# Patient Record
Sex: MALE | Race: WHITE | Employment: OTHER | URBAN - METROPOLITAN AREA
[De-identification: names, ages, dates, MRNs, and addresses within clinical notes are randomized per-mention and may not be internally consistent; named-entity substitution may affect disease eponyms.]

---

## 2017-02-22 RX ORDER — ATENOLOL 25 MG/1
50 TABLET ORAL DAILY
COMMUNITY
Start: 2015-10-21 | End: 2017-03-08

## 2017-02-22 RX ORDER — ROSUVASTATIN CALCIUM 5 MG/1
5 TABLET, COATED ORAL DAILY
COMMUNITY
Start: 2008-11-19 | End: 2017-10-23

## 2017-02-22 RX ORDER — LOSARTAN POTASSIUM 25 MG/1
25 TABLET ORAL DAILY
COMMUNITY
Start: 2016-01-18 | End: 2017-03-08

## 2017-02-22 RX ORDER — ALLOPURINOL 300 MG/1
300 TABLET ORAL DAILY
COMMUNITY
Start: 2011-09-29 | End: 2018-03-01

## 2017-02-22 RX ORDER — INDOMETHACIN 50 MG/1
50 CAPSULE ORAL 4 TIMES DAILY PRN
COMMUNITY
Start: 2012-10-08 | End: 2017-11-06

## 2017-03-08 ENCOUNTER — OFFICE VISIT (OUTPATIENT)
Dept: FAMILY MEDICINE CLINIC | Facility: CLINIC | Age: 67
End: 2017-03-08

## 2017-03-08 VITALS
SYSTOLIC BLOOD PRESSURE: 170 MMHG | TEMPERATURE: 97 F | DIASTOLIC BLOOD PRESSURE: 90 MMHG | WEIGHT: 200 LBS | HEIGHT: 71.5 IN | HEART RATE: 68 BPM | BODY MASS INDEX: 27.39 KG/M2

## 2017-03-08 DIAGNOSIS — I10 ESSENTIAL HYPERTENSION: Primary | ICD-10-CM

## 2017-03-08 PROBLEM — I83.90 ASYMPTOMATIC VARICOSE VEINS: Status: ACTIVE | Noted: 2017-03-08

## 2017-03-08 PROBLEM — E78.49 FAMILIAL MULTIPLE LIPOPROTEIN-TYPE HYPERLIPIDEMIA: Status: ACTIVE | Noted: 2017-03-08

## 2017-03-08 PROCEDURE — 99213 OFFICE O/P EST LOW 20 MIN: CPT | Performed by: FAMILY MEDICINE

## 2017-03-08 RX ORDER — LOSARTAN POTASSIUM 50 MG/1
50 TABLET ORAL DAILY
Qty: 90 TABLET | Refills: 3 | COMMUNITY
Start: 2017-03-08 | End: 2017-04-13

## 2017-03-08 RX ORDER — ATENOLOL 50 MG/1
50 TABLET ORAL DAILY
Qty: 90 TABLET | Refills: 3 | Status: SHIPPED | OUTPATIENT
Start: 2017-03-08 | End: 2018-03-01

## 2017-03-09 ENCOUNTER — TELEPHONE (OUTPATIENT)
Dept: FAMILY MEDICINE CLINIC | Facility: CLINIC | Age: 67
End: 2017-03-09

## 2017-03-09 DIAGNOSIS — N40.0 BENIGN PROSTATIC HYPERPLASIA WITHOUT LOWER URINARY TRACT SYMPTOMS, UNSPECIFIED MORPHOLOGY: ICD-10-CM

## 2017-03-09 DIAGNOSIS — I15.9 SECONDARY HYPERTENSION: ICD-10-CM

## 2017-03-09 DIAGNOSIS — M10.9 GOUT, UNSPECIFIED CAUSE, UNSPECIFIED CHRONICITY, UNSPECIFIED SITE: ICD-10-CM

## 2017-03-09 DIAGNOSIS — E11.9 CONTROLLED TYPE 2 DIABETES MELLITUS WITHOUT COMPLICATION, WITHOUT LONG-TERM CURRENT USE OF INSULIN (HCC): ICD-10-CM

## 2017-03-09 DIAGNOSIS — E78.5 HYPERLIPIDEMIA, UNSPECIFIED HYPERLIPIDEMIA TYPE: Primary | ICD-10-CM

## 2017-03-09 NOTE — PROGRESS NOTES
[address]  PROGRESS NOTE  Chief Complaint:   Patient presents with: Follow - Up: BP check      HPI:   This is a 77year old male coming in for recheck of hypertension.   The fall patient's atenolol was decreased to 25 mg daily while add THE EVENING Disp: 270 tablet Rfl: 0      Counseling given: Not Answered       REVIEW OF SYSTEMS:   Denies eye, ear, nose, throat, cardiovascular, respiratory, gastrointestinal, genitourinary, musculoskeletal, skin, or neurological sxs other than those desc with any questions, complications, allergies, or worsening or changing symptoms. Patient is to call with any side effects or complications from the treatments as a result of today.      Problem List:  Patient Active Problem List:     Eczema     Diabetes me

## 2017-04-13 RX ORDER — LOSARTAN POTASSIUM 50 MG/1
50 TABLET ORAL DAILY
Qty: 90 TABLET | Refills: 3 | Status: SHIPPED | OUTPATIENT
Start: 2017-04-13 | End: 2018-03-01

## 2017-04-22 ENCOUNTER — LAB ENCOUNTER (OUTPATIENT)
Dept: LAB | Age: 67
End: 2017-04-22
Attending: FAMILY MEDICINE
Payer: MEDICARE

## 2017-04-22 DIAGNOSIS — N40.0 BENIGN PROSTATIC HYPERPLASIA WITHOUT LOWER URINARY TRACT SYMPTOMS, UNSPECIFIED MORPHOLOGY: ICD-10-CM

## 2017-04-22 DIAGNOSIS — E78.5 HYPERLIPIDEMIA, UNSPECIFIED HYPERLIPIDEMIA TYPE: ICD-10-CM

## 2017-04-22 DIAGNOSIS — M10.9 GOUT, UNSPECIFIED CAUSE, UNSPECIFIED CHRONICITY, UNSPECIFIED SITE: ICD-10-CM

## 2017-04-22 DIAGNOSIS — E11.9 CONTROLLED TYPE 2 DIABETES MELLITUS WITHOUT COMPLICATION, WITHOUT LONG-TERM CURRENT USE OF INSULIN (HCC): ICD-10-CM

## 2017-04-22 DIAGNOSIS — I15.9 SECONDARY HYPERTENSION: ICD-10-CM

## 2017-04-22 PROCEDURE — 84550 ASSAY OF BLOOD/URIC ACID: CPT

## 2017-04-22 PROCEDURE — 84443 ASSAY THYROID STIM HORMONE: CPT

## 2017-04-22 PROCEDURE — 85025 COMPLETE CBC W/AUTO DIFF WBC: CPT

## 2017-04-22 PROCEDURE — 80061 LIPID PANEL: CPT

## 2017-04-22 PROCEDURE — 82570 ASSAY OF URINE CREATININE: CPT

## 2017-04-22 PROCEDURE — 84153 ASSAY OF PSA TOTAL: CPT

## 2017-04-22 PROCEDURE — 82043 UR ALBUMIN QUANTITATIVE: CPT

## 2017-04-22 PROCEDURE — 80053 COMPREHEN METABOLIC PANEL: CPT

## 2017-04-22 PROCEDURE — 83036 HEMOGLOBIN GLYCOSYLATED A1C: CPT

## 2017-04-24 ENCOUNTER — TELEPHONE (OUTPATIENT)
Dept: FAMILY MEDICINE CLINIC | Facility: CLINIC | Age: 67
End: 2017-04-24

## 2017-04-24 NOTE — TELEPHONE ENCOUNTER
Informed pt of his blood work results. Pt will watch diet and repeat blood work in 3 months. Pt expressed understanding and thanks.

## 2017-04-24 NOTE — TELEPHONE ENCOUNTER
----- Message from Myrl Lesch, MD sent at 4/24/2017  3:56 PM CDT -----  Labs are normal with exception of FBS of 147 and an A1c of 6.9, was 6.4, 6.6 and 7.0 in the past.  Sodium is slightly low at 134 but patient is not on a diuretic.   This is not sig

## 2017-04-29 ENCOUNTER — TELEPHONE (OUTPATIENT)
Dept: FAMILY MEDICINE CLINIC | Facility: CLINIC | Age: 67
End: 2017-04-29

## 2017-04-29 DIAGNOSIS — E11.9 CONTROLLED TYPE 2 DIABETES MELLITUS WITHOUT COMPLICATION, WITHOUT LONG-TERM CURRENT USE OF INSULIN (HCC): Primary | ICD-10-CM

## 2017-07-05 ENCOUNTER — TELEPHONE (OUTPATIENT)
Dept: FAMILY MEDICINE CLINIC | Facility: CLINIC | Age: 67
End: 2017-07-05

## 2017-07-05 NOTE — TELEPHONE ENCOUNTER
Patient is wondering if he was due for labs. Informed patient that he is due for labs at the end of the month. Patient confirmed and was transferred to the front to make appt. No other questions at this time.      Future Appointments  Date Time Provider Dep

## 2017-07-26 ENCOUNTER — APPOINTMENT (OUTPATIENT)
Dept: LAB | Age: 67
End: 2017-07-26
Attending: FAMILY MEDICINE
Payer: MEDICARE

## 2017-07-26 DIAGNOSIS — E11.9 CONTROLLED TYPE 2 DIABETES MELLITUS WITHOUT COMPLICATION, WITHOUT LONG-TERM CURRENT USE OF INSULIN (HCC): ICD-10-CM

## 2017-07-26 LAB
BUN BLD-MCNC: 14 MG/DL (ref 8–20)
CALCIUM BLD-MCNC: 8.5 MG/DL (ref 8.3–10.3)
CHLORIDE: 104 MMOL/L (ref 101–111)
CO2: 31 MMOL/L (ref 22–32)
CREAT BLD-MCNC: 1.11 MG/DL (ref 0.7–1.3)
EST. AVERAGE GLUCOSE BLD GHB EST-MCNC: 140 MG/DL (ref 68–126)
GLUCOSE BLD-MCNC: 151 MG/DL (ref 70–99)
HBA1C MFR BLD HPLC: 6.5 % (ref ?–5.7)
POTASSIUM SERPL-SCNC: 4.4 MMOL/L (ref 3.6–5.1)
SODIUM SERPL-SCNC: 140 MMOL/L (ref 136–144)

## 2017-07-26 PROCEDURE — 83036 HEMOGLOBIN GLYCOSYLATED A1C: CPT

## 2017-07-26 PROCEDURE — 80048 BASIC METABOLIC PNL TOTAL CA: CPT

## 2017-07-26 PROCEDURE — 36415 COLL VENOUS BLD VENIPUNCTURE: CPT

## 2017-07-27 ENCOUNTER — TELEPHONE (OUTPATIENT)
Dept: FAMILY MEDICINE CLINIC | Facility: CLINIC | Age: 67
End: 2017-07-27

## 2017-07-27 NOTE — TELEPHONE ENCOUNTER
----- Message from TADEO Evans sent at 7/27/2017  2:47 PM CDT -----  Dr. Emmy Anna notify patient that his A1c has improved it is now 6.5 (it was 6.9)  His CMP shows that his electrolytes are within normal limits (they had been abn

## 2017-07-27 NOTE — TELEPHONE ENCOUNTER
Informed pt of his blood work results. Pt was very happt with his a1c. Pt will recheck HTN in September or October. Pt expressed understanding and thanks.

## 2017-10-23 RX ORDER — ROSUVASTATIN CALCIUM 5 MG/1
TABLET, COATED ORAL
Qty: 90 TABLET | Refills: 1 | Status: SHIPPED | OUTPATIENT
Start: 2017-10-23 | End: 2018-04-30

## 2017-10-23 NOTE — TELEPHONE ENCOUNTER
Future appt:  None  Last Appointment:  3/8/2017; No f/u recommended    Cholesterol, Total (mg/dL)   Date Value   04/22/2017 136   ----------  HDL Cholesterol (mg/dL)   Date Value   04/22/2017 37 (L)   ----------  LDL Cholesterol (mg/dL)   Date Value   04/2

## 2017-11-06 ENCOUNTER — OFFICE VISIT (OUTPATIENT)
Dept: FAMILY MEDICINE CLINIC | Facility: CLINIC | Age: 67
End: 2017-11-06

## 2017-11-06 ENCOUNTER — APPOINTMENT (OUTPATIENT)
Dept: LAB | Age: 67
End: 2017-11-06
Attending: FAMILY MEDICINE
Payer: MEDICARE

## 2017-11-06 VITALS
TEMPERATURE: 97 F | SYSTOLIC BLOOD PRESSURE: 156 MMHG | HEART RATE: 54 BPM | DIASTOLIC BLOOD PRESSURE: 78 MMHG | RESPIRATION RATE: 16 BRPM | HEIGHT: 71.75 IN | BODY MASS INDEX: 27.55 KG/M2 | WEIGHT: 201.19 LBS

## 2017-11-06 DIAGNOSIS — L30.9 DERMATITIS: Primary | ICD-10-CM

## 2017-11-06 DIAGNOSIS — E11.9 CONTROLLED TYPE 2 DIABETES MELLITUS WITHOUT COMPLICATION, WITHOUT LONG-TERM CURRENT USE OF INSULIN (HCC): ICD-10-CM

## 2017-11-06 PROCEDURE — 83036 HEMOGLOBIN GLYCOSYLATED A1C: CPT | Performed by: FAMILY MEDICINE

## 2017-11-06 PROCEDURE — 99213 OFFICE O/P EST LOW 20 MIN: CPT | Performed by: FAMILY MEDICINE

## 2017-11-06 PROCEDURE — 36415 COLL VENOUS BLD VENIPUNCTURE: CPT | Performed by: FAMILY MEDICINE

## 2017-11-06 NOTE — PROGRESS NOTES
[address]  PROGRESS NOTE  Chief Complaint:   Patient presents with:  Rash: rash on hands, feet and back of legs. 12 days.  very itchy      HPI:   This is a 77year old male coming in for rash on hands and ankles/feet for the past 12 days ONCE DAILY Disp: 90 tablet Rfl: 1   METFORMIN  MG Oral Tab TAKE ONE TABLET BY MOUTH IN THE MORNING AND TWO IN THE EVENING Disp: 270 tablet Rfl: 3   Losartan Potassium 50 MG Oral Tab Take 1 tablet (50 mg total) by mouth daily.  Disp: 90 tablet Rfl: 3 needed. PLAN: Nonspecific dermatitis. These appear to be somewhat like bug bites. Not particularly typical of scabies. Has no pets at home and has not traveled anywhere. Will use cortisone cream for right now.   If no improvement then must consid

## 2017-11-07 ENCOUNTER — TELEPHONE (OUTPATIENT)
Dept: FAMILY MEDICINE CLINIC | Facility: CLINIC | Age: 67
End: 2017-11-07

## 2017-11-07 NOTE — TELEPHONE ENCOUNTER
Patient informed of the below results and recommendations. Patient will c/b to schedule a lab appt in 3 months.

## 2017-11-07 NOTE — TELEPHONE ENCOUNTER
----- Message from Liz Kolb MD sent at 11/6/2017  6:45 PM CST -----  A1c is 6.7, was 6.5 and 6.9. Patient would like him to repeat A1c in 3 more months.   Please notify patient

## 2018-03-01 ENCOUNTER — OFFICE VISIT (OUTPATIENT)
Dept: FAMILY MEDICINE CLINIC | Facility: CLINIC | Age: 68
End: 2018-03-01

## 2018-03-01 ENCOUNTER — APPOINTMENT (OUTPATIENT)
Dept: LAB | Age: 68
End: 2018-03-01
Attending: FAMILY MEDICINE
Payer: MEDICARE

## 2018-03-01 VITALS
RESPIRATION RATE: 16 BRPM | BODY MASS INDEX: 28.7 KG/M2 | HEIGHT: 71 IN | WEIGHT: 205 LBS | HEART RATE: 52 BPM | TEMPERATURE: 97 F | SYSTOLIC BLOOD PRESSURE: 140 MMHG | DIASTOLIC BLOOD PRESSURE: 86 MMHG

## 2018-03-01 DIAGNOSIS — E11.9 CONTROLLED TYPE 2 DIABETES MELLITUS WITHOUT COMPLICATION, WITHOUT LONG-TERM CURRENT USE OF INSULIN (HCC): ICD-10-CM

## 2018-03-01 DIAGNOSIS — E78.5 HYPERLIPIDEMIA, UNSPECIFIED HYPERLIPIDEMIA TYPE: ICD-10-CM

## 2018-03-01 DIAGNOSIS — M10.9 GOUT, UNSPECIFIED CAUSE, UNSPECIFIED CHRONICITY, UNSPECIFIED SITE: ICD-10-CM

## 2018-03-01 DIAGNOSIS — E78.49 FAMILIAL MULTIPLE LIPOPROTEIN-TYPE HYPERLIPIDEMIA: ICD-10-CM

## 2018-03-01 DIAGNOSIS — Z00.00 HEALTH CARE MAINTENANCE: Primary | ICD-10-CM

## 2018-03-01 DIAGNOSIS — I10 ESSENTIAL HYPERTENSION: ICD-10-CM

## 2018-03-01 LAB
EST. AVERAGE GLUCOSE BLD GHB EST-MCNC: 171 MG/DL (ref 68–126)
HBA1C MFR BLD HPLC: 7.6 % (ref ?–5.7)

## 2018-03-01 PROCEDURE — 36415 COLL VENOUS BLD VENIPUNCTURE: CPT | Performed by: FAMILY MEDICINE

## 2018-03-01 PROCEDURE — 83036 HEMOGLOBIN GLYCOSYLATED A1C: CPT | Performed by: FAMILY MEDICINE

## 2018-03-01 PROCEDURE — 90732 PPSV23 VACC 2 YRS+ SUBQ/IM: CPT | Performed by: FAMILY MEDICINE

## 2018-03-01 PROCEDURE — G0439 PPPS, SUBSEQ VISIT: HCPCS | Performed by: FAMILY MEDICINE

## 2018-03-01 PROCEDURE — G0102 PROSTATE CA SCREENING; DRE: HCPCS | Performed by: FAMILY MEDICINE

## 2018-03-01 PROCEDURE — G0009 ADMIN PNEUMOCOCCAL VACCINE: HCPCS | Performed by: FAMILY MEDICINE

## 2018-03-01 RX ORDER — ALLOPURINOL 300 MG/1
300 TABLET ORAL DAILY
Qty: 90 TABLET | Refills: 3 | Status: SHIPPED | OUTPATIENT
Start: 2018-03-01 | End: 2019-01-07

## 2018-03-01 RX ORDER — LOSARTAN POTASSIUM 50 MG/1
50 TABLET ORAL DAILY
Qty: 90 TABLET | Refills: 3 | Status: SHIPPED | OUTPATIENT
Start: 2018-03-01 | End: 2019-01-07

## 2018-03-01 RX ORDER — ATENOLOL 50 MG/1
50 TABLET ORAL DAILY
Qty: 90 TABLET | Refills: 3 | Status: SHIPPED | OUTPATIENT
Start: 2018-03-01 | End: 2019-01-07

## 2018-03-01 NOTE — PROGRESS NOTES
[facility] [place]  PROGRESS NOTE  Chief Complaint:   Patient presents with:  Physical: refill atenolol and allopurinol       HPI:   This is a 79year old male coming in for general wellness and recheck of problems of.   Overall the patient feels needed. Disp: 60 g Rfl: 1   ROSUVASTATIN CALCIUM 5 MG Oral Tab TAKE ONE TABLET BY MOUTH ONCE DAILY Disp: 90 tablet Rfl: 1   Glucose Blood (ONETOUCH ULTRA BLUE) In Vitro Strip  Disp:  Rfl:    MetFORMIN HCl 500 MG Oral Tab 500 mg 3 (three) times daily.  Disp: icterus, conjunctivae clear bilaterally, no eye discharge Ears: External normal. Nose: patent, no nasal discharge Throat:  No tonsillar erythema or exudate. Mouth:  No oral lesions or ulcerations, good dentition.   NECK: Supple, no CLAD, no JVD, no thyrome Hyperlipidemia: Continue to watch diet and continue with medication. 4.  BPH: Continue stay hydrated. 5.  History of gout: No new episodes. Continue with allopurinol. We will do full labs in 3 months.       Health Maintenance:  Diabetes Care Foot Exam d

## 2018-03-02 ENCOUNTER — TELEPHONE (OUTPATIENT)
Dept: FAMILY MEDICINE CLINIC | Facility: CLINIC | Age: 68
End: 2018-03-02

## 2018-03-02 NOTE — TELEPHONE ENCOUNTER
Informed pt of his blood work results. Pt expressed understanding and will watch diet. Schedule blood work for 3 months.     Future Appointments  Date Time Provider Marlen Benitez   6/4/2018 8:00 AM REF [place] REF [facility]

## 2018-03-02 NOTE — TELEPHONE ENCOUNTER
----- Message from Kathy Benitez MD sent at 3/2/2018  7:04 AM CST -----  A1c is 7.6. As discussed at yesterday's office visit, patient will do better on diet and recheck labs in 3 months. Please notify patient. I believe the patient requested a copy.

## 2018-04-17 ENCOUNTER — TELEPHONE (OUTPATIENT)
Dept: FAMILY MEDICINE CLINIC | Facility: CLINIC | Age: 68
End: 2018-04-17

## 2018-04-17 NOTE — TELEPHONE ENCOUNTER
Patient informed that he was given #90 with 3 RFs on 3/1/2018 to [facility]. Patient will contact the pharmacy and c/b if any problems.

## 2018-04-30 RX ORDER — ROSUVASTATIN CALCIUM 5 MG/1
TABLET, COATED ORAL
Qty: 90 TABLET | Refills: 2 | Status: SHIPPED | OUTPATIENT
Start: 2018-04-30 | End: 2019-01-07

## 2018-04-30 NOTE — TELEPHONE ENCOUNTER
Future appt:     Your appointments     Date & Time Appointment Department [facility])    Jun 04, 2018  8:00 AM CDT Laboratory Visit with REF [facility] ([facility] [place])        [facility]  [facility] [place]  [address] [place] [address]

## 2018-05-09 ENCOUNTER — OFFICE VISIT (OUTPATIENT)
Dept: FAMILY MEDICINE CLINIC | Facility: CLINIC | Age: 68
End: 2018-05-09

## 2018-05-09 VITALS
WEIGHT: 206.38 LBS | TEMPERATURE: 97 F | HEART RATE: 52 BPM | RESPIRATION RATE: 16 BRPM | SYSTOLIC BLOOD PRESSURE: 124 MMHG | HEIGHT: 71 IN | OXYGEN SATURATION: 96 % | DIASTOLIC BLOOD PRESSURE: 74 MMHG | BODY MASS INDEX: 28.89 KG/M2

## 2018-05-09 DIAGNOSIS — J40 BRONCHITIS: Primary | ICD-10-CM

## 2018-05-09 PROCEDURE — 99214 OFFICE O/P EST MOD 30 MIN: CPT | Performed by: NURSE PRACTITIONER

## 2018-05-09 RX ORDER — AZITHROMYCIN 250 MG/1
TABLET, FILM COATED ORAL
Qty: 6 TABLET | Refills: 0 | Status: SHIPPED | OUTPATIENT
Start: 2018-05-09 | End: 2018-06-07 | Stop reason: ALTCHOICE

## 2018-05-09 RX ORDER — PROMETHAZINE HYDROCHLORIDE AND CODEINE PHOSPHATE 6.25; 1 MG/5ML; MG/5ML
2.5 SYRUP ORAL EVERY 4 HOURS PRN
Qty: 240 ML | Refills: 0 | Status: SHIPPED | OUTPATIENT
Start: 2018-05-09 | End: 2018-06-07 | Stop reason: ALTCHOICE

## 2018-05-09 NOTE — PATIENT INSTRUCTIONS
Rest, increase fluids, salt water gargles ,neti pot (use distilled water) or saline nasal spray, Mucinex-DM, Tylenol/Ibuprofen, follow up if symptoms persist or increase.

## 2018-05-09 NOTE — PROGRESS NOTES
CHIEF COMPLAINT:   Patient presents with:  Cough: coughing, chest congestion, started last wednesday      HPI:   Isidra Neumann is a 79year old male who presents to clinic today with complaints of coughing for a week- thought it was a little better yesterda Smokeless tobacco: Never Used                      Alcohol use:  No                 REVIEW OF SYSTEMS:   GENERAL: See HPI  SKIN: no unusual skin lesions or rashes  HEENT: See HPI  THYROID: normal size, no nodules  LUNGS: see HPI   CARDIOVASCULAR: No delfina Visit:    Signed Prescriptions Disp Refills    azithromycin (ZITHROMAX Z-ADA) 250 MG Oral Tab 6 tablet 0      Sig: Take two tablets by mouth today, then one tablet daily.       promethazine-codeine 6.25-10 MG/5ML Oral Syrup 240 mL 0      Sig: Take 2.5 mL by

## 2018-05-19 ENCOUNTER — TELEPHONE (OUTPATIENT)
Dept: FAMILY MEDICINE CLINIC | Facility: CLINIC | Age: 68
End: 2018-05-19

## 2018-05-19 NOTE — TELEPHONE ENCOUNTER
Future appt:     Your appointments     Date & Time Appointment Department [facility])    Jun 04, 2018  8:00 AM CDT Laboratory Visit with REF [facility] ([facility] [place])        [id number] [facility]  [facility] [place]  [address] [place] [address]

## 2018-06-02 ENCOUNTER — TELEPHONE (OUTPATIENT)
Dept: FAMILY MEDICINE CLINIC | Facility: CLINIC | Age: 68
End: 2018-06-02

## 2018-06-02 DIAGNOSIS — M10.9 GOUT, UNSPECIFIED CAUSE, UNSPECIFIED CHRONICITY, UNSPECIFIED SITE: ICD-10-CM

## 2018-06-02 DIAGNOSIS — I10 ESSENTIAL HYPERTENSION: ICD-10-CM

## 2018-06-02 DIAGNOSIS — N40.1 BENIGN PROSTATIC HYPERPLASIA WITH LOWER URINARY TRACT SYMPTOMS, SYMPTOM DETAILS UNSPECIFIED: ICD-10-CM

## 2018-06-02 DIAGNOSIS — E78.5 HYPERLIPIDEMIA, UNSPECIFIED HYPERLIPIDEMIA TYPE: ICD-10-CM

## 2018-06-02 DIAGNOSIS — E11.9 CONTROLLED TYPE 2 DIABETES MELLITUS WITHOUT COMPLICATION, WITHOUT LONG-TERM CURRENT USE OF INSULIN (HCC): Primary | ICD-10-CM

## 2018-06-02 NOTE — TELEPHONE ENCOUNTER
----- Message from Leonard Plata sent at 6/2/2018 10:34 AM CDT -----  Regarding: lab orders needed   Patient has lab appointment on 6/4/18 could you please put lab orders in system.         Terrie

## 2018-06-04 ENCOUNTER — LABORATORY ENCOUNTER (OUTPATIENT)
Dept: LAB | Age: 68
End: 2018-06-04
Attending: FAMILY MEDICINE
Payer: MEDICARE

## 2018-06-04 DIAGNOSIS — N40.1 BENIGN PROSTATIC HYPERPLASIA WITH LOWER URINARY TRACT SYMPTOMS, SYMPTOM DETAILS UNSPECIFIED: ICD-10-CM

## 2018-06-04 DIAGNOSIS — E78.5 HYPERLIPIDEMIA, UNSPECIFIED HYPERLIPIDEMIA TYPE: ICD-10-CM

## 2018-06-04 DIAGNOSIS — I10 ESSENTIAL HYPERTENSION: ICD-10-CM

## 2018-06-04 DIAGNOSIS — E11.9 CONTROLLED TYPE 2 DIABETES MELLITUS WITHOUT COMPLICATION, WITHOUT LONG-TERM CURRENT USE OF INSULIN (HCC): ICD-10-CM

## 2018-06-04 DIAGNOSIS — M10.9 GOUT, UNSPECIFIED CAUSE, UNSPECIFIED CHRONICITY, UNSPECIFIED SITE: ICD-10-CM

## 2018-06-04 PROCEDURE — 84443 ASSAY THYROID STIM HORMONE: CPT

## 2018-06-04 PROCEDURE — 81003 URINALYSIS AUTO W/O SCOPE: CPT

## 2018-06-04 PROCEDURE — 36415 COLL VENOUS BLD VENIPUNCTURE: CPT

## 2018-06-04 PROCEDURE — 82043 UR ALBUMIN QUANTITATIVE: CPT

## 2018-06-04 PROCEDURE — 80053 COMPREHEN METABOLIC PANEL: CPT

## 2018-06-04 PROCEDURE — 84550 ASSAY OF BLOOD/URIC ACID: CPT

## 2018-06-04 PROCEDURE — 82570 ASSAY OF URINE CREATININE: CPT

## 2018-06-04 PROCEDURE — 80061 LIPID PANEL: CPT

## 2018-06-04 PROCEDURE — 85025 COMPLETE CBC W/AUTO DIFF WBC: CPT

## 2018-06-04 PROCEDURE — 83036 HEMOGLOBIN GLYCOSYLATED A1C: CPT

## 2018-06-04 PROCEDURE — 84153 ASSAY OF PSA TOTAL: CPT

## 2018-06-06 ENCOUNTER — TELEPHONE (OUTPATIENT)
Dept: FAMILY MEDICINE CLINIC | Facility: CLINIC | Age: 68
End: 2018-06-06

## 2018-06-06 NOTE — TELEPHONE ENCOUNTER
----- Message from Tad Goodrich MD sent at 6/6/2018  7:19 AM CDT -----  Labs are normal with the exception of a glucose of 164 and an A1c of 7.6, unchanged from 7.6.   Please notify patient and tell him that I would like him to make an appointment to [facility]

## 2018-06-06 NOTE — TELEPHONE ENCOUNTER
Informed pt of his blood work results. Pt expressed understanding and thanks. Schedule appt for tomorrow to discuss DM.

## 2018-06-07 ENCOUNTER — OFFICE VISIT (OUTPATIENT)
Dept: FAMILY MEDICINE CLINIC | Facility: CLINIC | Age: 68
End: 2018-06-07

## 2018-06-07 VITALS
RESPIRATION RATE: 16 BRPM | HEART RATE: 58 BPM | TEMPERATURE: 98 F | SYSTOLIC BLOOD PRESSURE: 120 MMHG | WEIGHT: 203.19 LBS | HEIGHT: 71 IN | DIASTOLIC BLOOD PRESSURE: 74 MMHG | BODY MASS INDEX: 28.45 KG/M2

## 2018-06-07 DIAGNOSIS — E11.9 CONTROLLED TYPE 2 DIABETES MELLITUS WITHOUT COMPLICATION, WITHOUT LONG-TERM CURRENT USE OF INSULIN (HCC): Primary | ICD-10-CM

## 2018-06-07 DIAGNOSIS — E78.5 HYPERLIPIDEMIA, UNSPECIFIED HYPERLIPIDEMIA TYPE: ICD-10-CM

## 2018-06-07 DIAGNOSIS — I10 ESSENTIAL HYPERTENSION: ICD-10-CM

## 2018-06-07 DIAGNOSIS — M10.9 GOUT, UNSPECIFIED CAUSE, UNSPECIFIED CHRONICITY, UNSPECIFIED SITE: ICD-10-CM

## 2018-06-07 PROCEDURE — 99214 OFFICE O/P EST MOD 30 MIN: CPT | Performed by: FAMILY MEDICINE

## 2018-06-07 NOTE — PROGRESS NOTES
[address]  PROGRESS NOTE  Chief Complaint:   Patient presents with: Follow - Up: go over labs      HPI:   This is a 79year old male coming in for recheck of diabetes and problems.   Recent blood tests show A1c of 7.6 which is unchanged Chol/HDL Ratio 3.33 <4.97   Non HDL Chol 84 <130 mg/dL   -ASSAY, THYROID STIM HORMONE   Result Value Ref Range   TSH 0.995 0.350 - 5.500 mIU/mL   -URIC ACID, SERUM   Result Value Ref Range   Uric Acid 6.5 2.4 - 8.7 mg/dL   -URINALYSIS, ROUTINE   Result V History:  Smoking status: Never Smoker                                                              Smokeless tobacco: Never Used                      Alcohol use:  No              Family History:  Family History   Problem Relation Age of Onset   • COPD [OT extremities,change in bowel or bladder control. HEMATOLOGIC:  Denies anemia, bleeding or bruising. LYMPHATICS:  Denies enlarged nodes   PSYCHIATRIC:  Denies depression or anxiety.       EXAM:   /74 (BP Location: Left arm, Patient Position: Sitting, 12/26/2000  Colonoscopy,10 Years due on 12/26/2000    Patient/Caregiver Education: Patient/Caregiver Education: There are no barriers to learning. Medical education done. Outcome: Patient verbalizes understanding.  Patient is notified to call with any que

## 2018-06-07 NOTE — PATIENT INSTRUCTIONS
Continue with same medication. Continue to work on diet, weight, and exercise. Recheck A1c in 3 months.

## 2018-06-11 ENCOUNTER — MED REC SCAN ONLY (OUTPATIENT)
Dept: FAMILY MEDICINE CLINIC | Facility: CLINIC | Age: 68
End: 2018-06-11

## 2018-09-07 ENCOUNTER — APPOINTMENT (OUTPATIENT)
Dept: LAB | Age: 68
End: 2018-09-07
Attending: FAMILY MEDICINE
Payer: MEDICARE

## 2018-09-07 DIAGNOSIS — E11.9 CONTROLLED TYPE 2 DIABETES MELLITUS WITHOUT COMPLICATION, WITHOUT LONG-TERM CURRENT USE OF INSULIN (HCC): ICD-10-CM

## 2018-09-07 LAB
EST. AVERAGE GLUCOSE BLD GHB EST-MCNC: 154 MG/DL (ref 68–126)
HBA1C MFR BLD HPLC: 7 % (ref ?–5.7)

## 2018-09-07 PROCEDURE — 83036 HEMOGLOBIN GLYCOSYLATED A1C: CPT

## 2018-09-07 PROCEDURE — 36415 COLL VENOUS BLD VENIPUNCTURE: CPT

## 2018-09-10 ENCOUNTER — TELEPHONE (OUTPATIENT)
Dept: FAMILY MEDICINE CLINIC | Facility: CLINIC | Age: 68
End: 2018-09-10

## 2018-09-10 NOTE — TELEPHONE ENCOUNTER
Informed pt of his blood word results. Pt expressed understanding and thanks.     Future Appointments   Date Time Provider Marlen Benitez   12/12/2018  8:00 AM REF [place] [facility]F [facility]

## 2018-09-10 NOTE — TELEPHONE ENCOUNTER
----- Message from Hazel Spencer MD sent at 9/10/2018  7:05 AM CDT -----  A1c is much improved at 7.0, was 7.6. Recheck A1c in 3 months again.   Please notify patient

## 2018-12-10 ENCOUNTER — TELEPHONE (OUTPATIENT)
Dept: FAMILY MEDICINE CLINIC | Facility: CLINIC | Age: 68
End: 2018-12-10

## 2018-12-10 DIAGNOSIS — E11.9 CONTROLLED TYPE 2 DIABETES MELLITUS WITHOUT COMPLICATION, WITHOUT LONG-TERM CURRENT USE OF INSULIN (HCC): Primary | ICD-10-CM

## 2018-12-10 NOTE — TELEPHONE ENCOUNTER
----- Message from Ashley Torres sent at 12/10/2018  8:34 AM CST -----  Regarding: lab orders needed   Patient has lab appointment on 12/12/18 could you please put lab orders in system.         Thanks,  Terrie

## 2018-12-12 ENCOUNTER — APPOINTMENT (OUTPATIENT)
Dept: LAB | Age: 68
End: 2018-12-12
Attending: FAMILY MEDICINE
Payer: MEDICARE

## 2018-12-12 DIAGNOSIS — E11.9 CONTROLLED TYPE 2 DIABETES MELLITUS WITHOUT COMPLICATION, WITHOUT LONG-TERM CURRENT USE OF INSULIN (HCC): ICD-10-CM

## 2018-12-12 PROCEDURE — 36415 COLL VENOUS BLD VENIPUNCTURE: CPT

## 2018-12-12 PROCEDURE — 83036 HEMOGLOBIN GLYCOSYLATED A1C: CPT

## 2018-12-13 ENCOUNTER — TELEPHONE (OUTPATIENT)
Dept: FAMILY MEDICINE CLINIC | Facility: CLINIC | Age: 68
End: 2018-12-13

## 2018-12-13 NOTE — TELEPHONE ENCOUNTER
Informed pt of his blood work results. Pt will f/u with another provider next year. Pt will watch diet. Pt expressed understanding and thanks.

## 2018-12-13 NOTE — TELEPHONE ENCOUNTER
----- Message from Corin Roberson MD sent at 12/12/2018  7:13 PM CST -----  A1c is 7.2, was 7.0 and 7.6. Continue work on diet. Recheck A1c in 3 months.   Please notify patient

## 2019-01-07 ENCOUNTER — OFFICE VISIT (OUTPATIENT)
Dept: FAMILY MEDICINE CLINIC | Facility: CLINIC | Age: 69
End: 2019-01-07
Payer: MEDICARE

## 2019-01-07 VITALS
HEIGHT: 71 IN | DIASTOLIC BLOOD PRESSURE: 82 MMHG | OXYGEN SATURATION: 97 % | RESPIRATION RATE: 14 BRPM | SYSTOLIC BLOOD PRESSURE: 140 MMHG | BODY MASS INDEX: 27.83 KG/M2 | HEART RATE: 54 BPM | TEMPERATURE: 99 F | WEIGHT: 198.81 LBS

## 2019-01-07 DIAGNOSIS — E78.49 FAMILIAL MULTIPLE LIPOPROTEIN-TYPE HYPERLIPIDEMIA: ICD-10-CM

## 2019-01-07 DIAGNOSIS — E11.9 CONTROLLED TYPE 2 DIABETES MELLITUS WITHOUT COMPLICATION, WITHOUT LONG-TERM CURRENT USE OF INSULIN (HCC): Primary | ICD-10-CM

## 2019-01-07 DIAGNOSIS — M1A.9XX0 CHRONIC GOUT WITHOUT TOPHUS, UNSPECIFIED CAUSE, UNSPECIFIED SITE: ICD-10-CM

## 2019-01-07 DIAGNOSIS — I10 ESSENTIAL HYPERTENSION: ICD-10-CM

## 2019-01-07 PROCEDURE — 99214 OFFICE O/P EST MOD 30 MIN: CPT | Performed by: FAMILY MEDICINE

## 2019-01-07 RX ORDER — LOSARTAN POTASSIUM 50 MG/1
50 TABLET ORAL DAILY
Qty: 90 TABLET | Refills: 3 | Status: SHIPPED | OUTPATIENT
Start: 2019-01-07

## 2019-01-07 RX ORDER — ALLOPURINOL 100 MG/1
100 TABLET ORAL DAILY
Qty: 90 TABLET | Refills: 3 | Status: SHIPPED | OUTPATIENT
Start: 2019-01-07

## 2019-01-07 RX ORDER — ATENOLOL 50 MG/1
50 TABLET ORAL DAILY
Qty: 90 TABLET | Refills: 3 | Status: SHIPPED | OUTPATIENT
Start: 2019-01-07

## 2019-01-07 RX ORDER — ROSUVASTATIN CALCIUM 5 MG/1
TABLET, COATED ORAL
Qty: 90 TABLET | Refills: 3 | Status: SHIPPED | OUTPATIENT
Start: 2019-01-07

## 2019-01-07 NOTE — PROGRESS NOTES
Chief Complaint:   Patient presents with:  Medication Follow-Up  Diabetes      HPI:   This is a 76year old male coming in for follow-up care. Patient new to me. Patient with a history of diabetes and hypertension as well as gout.   Reviewed recent labora daily. Disp: 270 tablet Rfl: 3   Glucose Blood (ONETOUCH ULTRA BLUE) In Vitro Strip  Disp:  Rfl:         Allergies:  No Known Allergies       REVIEW OF SYSTEMS:   CONSTITUTIONAL:  Denies , fever, chills, or fatigue,  EENT:  Eyes:  Denies eye pain, visual c cyanosis,FROM, 2+ dorsalis pedis pulses bilaterally. NEURO:  No deficit, normal gait, strength and tone, sensory intact,   PSYCH: no depression or anxiety noted. ASSESSMENT AND PLAN:   1.  Controlled type 2 diabetes mellitus without complication, with type II, controlled ([address]     Benign enlargement of prostate     Gout     Familial multiple lipoprotein-type hyperlipidemia     Essential hypertension     Thrombocytopenia ([address]     Asymptomatic varicose veins      Markel Owen MD  1/7/2019  9:00 AM

## 2019-01-07 NOTE — PATIENT INSTRUCTIONS
Good check     Recheck of labs and physical in 6 months. Stay active. Obtain eye exam in near future.

## 2020-02-26 ENCOUNTER — PATIENT OUTREACH (OUTPATIENT)
Dept: FAMILY MEDICINE CLINIC | Facility: CLINIC | Age: 70
End: 2020-02-26

## 2020-02-27 ENCOUNTER — TELEPHONE (OUTPATIENT)
Dept: FAMILY MEDICINE CLINIC | Facility: CLINIC | Age: 70
End: 2020-02-27

## 2020-02-27 NOTE — TELEPHONE ENCOUNTER
Patient states he now has a different PCP in [address] [facility], he sees Dr Jaqui Schultz and Saul Yan

## (undated) NOTE — LETTER
08/07/19        [address] [facility]      Dear Justino Carroll,    [address] records indicate that you have outstanding lab work and or testing that was ordered for you and has not yet been completed:  Orders Placed This Encounter      CMP

## (undated) NOTE — MR AVS SNAPSHOT
[address] [place]  [address] [id number] [phone]               Thank you for choosing us for your health care visit with Trino Bowen MD.  We are glad to serve you and happy to provide you with this summary of 500 mg 3 (three) times daily.    Commonly known as:  GLUCOPHAGE           * MetFORMIN HCl 500 MG Tabs   TAKE ONE TABLET BY MOUTH IN THE MORNING AND TWO IN THE EVENING   Commonly known as:  GLUCOPHAGE           ONETOUCH ULTRA BLUE Strp   Generic drug:  Gluco